# Patient Record
Sex: FEMALE | Race: ASIAN | Employment: OTHER | URBAN - METROPOLITAN AREA
[De-identification: names, ages, dates, MRNs, and addresses within clinical notes are randomized per-mention and may not be internally consistent; named-entity substitution may affect disease eponyms.]

---

## 2019-11-27 ENCOUNTER — APPOINTMENT (OUTPATIENT)
Dept: GENERAL RADIOLOGY | Facility: HOSPITAL | Age: 79
DRG: 390 | End: 2019-11-27
Attending: EMERGENCY MEDICINE
Payer: MEDICAID

## 2019-11-27 ENCOUNTER — HOSPITAL ENCOUNTER (INPATIENT)
Facility: HOSPITAL | Age: 79
LOS: 3 days | Discharge: HOME | DRG: 390 | End: 2019-11-30
Attending: EMERGENCY MEDICINE | Admitting: INTERNAL MEDICINE
Payer: MEDICAID

## 2019-11-27 DIAGNOSIS — R10.9 ABDOMINAL PAIN, ACUTE: Primary | ICD-10-CM

## 2019-11-27 DIAGNOSIS — K56.609 SMALL BOWEL OBSTRUCTION (HCC): ICD-10-CM

## 2019-11-27 PROBLEM — D72.829 LEUKOCYTOSIS, UNSPECIFIED TYPE: Status: ACTIVE | Noted: 2019-11-27

## 2019-11-27 PROBLEM — R01.1 CARDIAC MURMUR, UNSPECIFIED: Status: ACTIVE | Noted: 2019-11-27

## 2019-11-27 PROBLEM — R11.2 NON-INTRACTABLE VOMITING WITH NAUSEA, UNSPECIFIED VOMITING TYPE: Status: ACTIVE | Noted: 2019-11-27

## 2019-11-27 PROBLEM — R17 TOTAL BILIRUBIN, ELEVATED: Status: ACTIVE | Noted: 2019-11-27

## 2019-11-27 PROCEDURE — 80053 COMPREHEN METABOLIC PANEL: CPT | Performed by: PHYSICIAN ASSISTANT

## 2019-11-27 PROCEDURE — 83690 ASSAY OF LIPASE: CPT | Performed by: PHYSICIAN ASSISTANT

## 2019-11-27 PROCEDURE — 71045 X-RAY EXAM CHEST 1 VIEW: CPT | Performed by: EMERGENCY MEDICINE

## 2019-11-27 PROCEDURE — 96361 HYDRATE IV INFUSION ADD-ON: CPT

## 2019-11-27 PROCEDURE — 99285 EMERGENCY DEPT VISIT HI MDM: CPT

## 2019-11-27 PROCEDURE — 96376 TX/PRO/DX INJ SAME DRUG ADON: CPT

## 2019-11-27 PROCEDURE — 96374 THER/PROPH/DIAG INJ IV PUSH: CPT

## 2019-11-27 PROCEDURE — 85025 COMPLETE CBC W/AUTO DIFF WBC: CPT | Performed by: PHYSICIAN ASSISTANT

## 2019-11-27 RX ORDER — HYDROMORPHONE HYDROCHLORIDE 1 MG/ML
0.4 INJECTION, SOLUTION INTRAMUSCULAR; INTRAVENOUS; SUBCUTANEOUS EVERY 2 HOUR PRN
Status: DISCONTINUED | OUTPATIENT
Start: 2019-11-27 | End: 2019-11-30

## 2019-11-27 RX ORDER — HEPARIN SODIUM 5000 [USP'U]/ML
5000 INJECTION, SOLUTION INTRAVENOUS; SUBCUTANEOUS EVERY 8 HOURS SCHEDULED
Status: DISCONTINUED | OUTPATIENT
Start: 2019-11-27 | End: 2019-11-30

## 2019-11-27 RX ORDER — MORPHINE SULFATE 4 MG/ML
4 INJECTION, SOLUTION INTRAMUSCULAR; INTRAVENOUS ONCE
Status: DISCONTINUED | OUTPATIENT
Start: 2019-11-27 | End: 2019-11-30

## 2019-11-27 RX ORDER — ONDANSETRON 2 MG/ML
4 INJECTION INTRAMUSCULAR; INTRAVENOUS ONCE
Status: COMPLETED | OUTPATIENT
Start: 2019-11-27 | End: 2019-11-27

## 2019-11-27 RX ORDER — ONDANSETRON 2 MG/ML
INJECTION INTRAMUSCULAR; INTRAVENOUS
Status: DISPENSED
Start: 2019-11-27 | End: 2019-11-28

## 2019-11-27 RX ORDER — LIDOCAINE HYDROCHLORIDE 20 MG/ML
JELLY TOPICAL
Status: COMPLETED
Start: 2019-11-27 | End: 2019-11-27

## 2019-11-27 RX ORDER — ONDANSETRON 2 MG/ML
4 INJECTION INTRAMUSCULAR; INTRAVENOUS EVERY 6 HOURS PRN
Status: DISCONTINUED | OUTPATIENT
Start: 2019-11-27 | End: 2019-11-30

## 2019-11-27 RX ORDER — ONDANSETRON 2 MG/ML
4 INJECTION INTRAMUSCULAR; INTRAVENOUS EVERY 4 HOURS PRN
Status: DISCONTINUED | OUTPATIENT
Start: 2019-11-27 | End: 2019-11-27

## 2019-11-27 RX ORDER — SODIUM CHLORIDE 9 MG/ML
INJECTION, SOLUTION INTRAVENOUS CONTINUOUS
Status: DISCONTINUED | OUTPATIENT
Start: 2019-11-27 | End: 2019-11-30

## 2019-11-27 RX ORDER — METOCLOPRAMIDE HYDROCHLORIDE 5 MG/ML
5 INJECTION INTRAMUSCULAR; INTRAVENOUS EVERY 8 HOURS PRN
Status: DISCONTINUED | OUTPATIENT
Start: 2019-11-27 | End: 2019-11-30

## 2019-11-27 RX ORDER — LIDOCAINE HYDROCHLORIDE 20 MG/ML
10 JELLY TOPICAL ONCE
Status: COMPLETED | OUTPATIENT
Start: 2019-11-27 | End: 2019-11-27

## 2019-11-27 RX ORDER — HYDROMORPHONE HYDROCHLORIDE 1 MG/ML
0.8 INJECTION, SOLUTION INTRAMUSCULAR; INTRAVENOUS; SUBCUTANEOUS EVERY 2 HOUR PRN
Status: DISCONTINUED | OUTPATIENT
Start: 2019-11-27 | End: 2019-11-30

## 2019-11-27 RX ORDER — SODIUM CHLORIDE 9 MG/ML
INJECTION, SOLUTION INTRAVENOUS CONTINUOUS
Status: ACTIVE | OUTPATIENT
Start: 2019-11-27 | End: 2019-11-27

## 2019-11-27 RX ORDER — HYDROMORPHONE HYDROCHLORIDE 1 MG/ML
0.2 INJECTION, SOLUTION INTRAMUSCULAR; INTRAVENOUS; SUBCUTANEOUS EVERY 2 HOUR PRN
Status: DISCONTINUED | OUTPATIENT
Start: 2019-11-27 | End: 2019-11-30

## 2019-11-27 RX ORDER — MORPHINE SULFATE 4 MG/ML
4 INJECTION, SOLUTION INTRAMUSCULAR; INTRAVENOUS EVERY 30 MIN PRN
Status: DISCONTINUED | OUTPATIENT
Start: 2019-11-27 | End: 2019-11-27

## 2019-11-28 PROCEDURE — 87077 CULTURE AEROBIC IDENTIFY: CPT | Performed by: INTERNAL MEDICINE

## 2019-11-28 PROCEDURE — 87086 URINE CULTURE/COLONY COUNT: CPT | Performed by: INTERNAL MEDICINE

## 2019-11-28 PROCEDURE — 81001 URINALYSIS AUTO W/SCOPE: CPT | Performed by: INTERNAL MEDICINE

## 2019-11-28 PROCEDURE — 85025 COMPLETE CBC W/AUTO DIFF WBC: CPT | Performed by: INTERNAL MEDICINE

## 2019-11-28 PROCEDURE — 80048 BASIC METABOLIC PNL TOTAL CA: CPT | Performed by: INTERNAL MEDICINE

## 2019-11-28 NOTE — PLAN OF CARE
NURSING ADMISSION NOTE      Patient admitted via cart. Oriented to room. Safety precautions initiated. Bed in low position. Call light in reach. Maintain strict NPO. IVF infusing. NG LIS. PRN nausea and pain med provided with relief.  Labs in am.

## 2019-11-28 NOTE — ED PROVIDER NOTES
Patient Seen in: [facility] Emergency Department      History   Patient presents with:  Abdomen/Flank Pain (GI/)    Stated Complaint: abd pain    HPI    61-year-old female who comes in today with acute abdominal pain with a past medical history of • CHOLECYSTECTOMY  07/2019    while in [facility]   • TOTAL ABDOM HYSTERECTOMY                      Social History    Tobacco Use      Smoking status: Never Smoker      Smokeless tobacco: Never Used    Alcohol use: No    Drug use:  No             Review of The following orders were created for panel order CBC WITH DIFFERENTIAL WITH PLATELET.   Procedure                               Abnormality         Status                     ---------                               -----------         ------ 11/27/2019  7:18 pm    Follow-up:  Timoteo Chanel MD  [address] [place] [address],9D              Medications Prescribed:  Current Discharge Medication List                   Present on Admission  Date Reviewed: 11/27/2019

## 2019-11-28 NOTE — PROGRESS NOTES
11/28/19 0924   Clinical Encounter Type   Visited With Patient  ( responded to Advanced Directive consult by giving patient the POA for Chelly [employer] 14 short form for her perusal and consideration.  )   Continue Visiting No  ( remain

## 2019-11-28 NOTE — ED NOTES
I reviewed that chart and discussed the case. I have examined the patient and noted that this is a 28-year-old female who arrives here with complaints of abdominal pain since Monday. The patient was seen at immediate care was told outpatient imaging.   Makayla Galindo

## 2019-11-28 NOTE — ED INITIAL ASSESSMENT (HPI)
Pt c/o abdominal pain, Pt was seen at  on Monday and was advised to go to outpatient imaging for R/O SBO.  Images showed Pt does have SMO, PT rpt she has begun to vomit today and has not had BM in 2-days

## 2019-11-28 NOTE — CONSULTS
[facility]  Report of Consultation    Benjychavez Jonesтатьяна Patient Status:  Inpatient    1940 MRN [id number]   [facility] 3NW-A Attending Marissa Mcbride MD   Hosp Day # 1 PCP Hollis Valenzuela MD     Reason for Consultation:  Abdominal Units, 5,000 Units, Subcutaneous, Q8H [address]  •  HYDROmorphone HCl (DILAUDID) 1 MG/ML injection 0.2 mg, 0.2 mg, Intravenous, Q2H PRN **OR** HYDROmorphone HCl (DILAUDID) 1 MG/ML injection 0.4 mg, 0.4 mg, Intravenous, Q2H PRN **OR** HYDROmorphone HCl (DILAUDID) examination is grossly normal.  No focal neurologic deficit.     Laboratory Data:  Lab Results   Component Value Date    WBC 11.5 11/28/2019    HGB 13.9 11/28/2019    HCT 43.1 11/28/2019    .0 11/28/2019    CREATSERUM 0.87 11/28/2019    BUN 15 11/28/

## 2019-11-28 NOTE — H&P
[facility] Hospitalist Team  History and Physical       Assessment/Plan:     #SBO  -conservative management  -ct reviewed, abdo xr today is pending  -general surgery following  -NGT in place, NPO, IVF, pain control    #HTN- hold bp meds, BP ok currently    #HL- h diarrhea. GENITOURINARY:  No dysuria, frequency or urgency. MUSCULOSKELETAL:  No arthritis  SKIN:  No change in skin, hair or nails. NEUROLOGIC:  No paresthesias, weakness, or numbness. PSYCHIATRIC:  No disorder of thought or mood.   ENDOCRINE:  No heat CHEST AP PORTABLE  (CPT=71045)  TECHNIQUE:  AP chest radiograph was obtained. COMPARISON:  None.   INDICATIONS:  Verify correct tube placement  PATIENT STATED HISTORY: (As transcribed by Technologist)     FINDINGS:  Tip of NG tube just distal to the gastro

## 2019-11-28 NOTE — PROGRESS NOTES
[facility] Pharmacy Note:  Renal Dose Adjustment for Metoclopramide (REGLAN)    Ankur Jean Baptiste has been prescribed Metoclopramide (REGLAN) 10 mg every 8 hours as needed for nausea,vomiting. Estimated Creatinine Clearance: 39.8 mL/min (based on SCr of 0.98 mg/dL).

## 2019-11-28 NOTE — PROGRESS NOTES
Vss. Pt a&ox3. Pt on ra with 02 sats wnl. Lungs cta. Pt denies difficulty breathing or sob. Pt denies pain.  Pt denies n/v. Patient states she is passing flatus and did have small formed bm this am. Remains npo. ngt in place to [facility] with light brown drainage

## 2019-11-29 ENCOUNTER — APPOINTMENT (OUTPATIENT)
Dept: GENERAL RADIOLOGY | Facility: HOSPITAL | Age: 79
DRG: 390 | End: 2019-11-29
Attending: SURGERY
Payer: MEDICAID

## 2019-11-29 PROCEDURE — 74019 RADEX ABDOMEN 2 VIEWS: CPT | Performed by: SURGERY

## 2019-11-29 PROCEDURE — 84132 ASSAY OF SERUM POTASSIUM: CPT | Performed by: INTERNAL MEDICINE

## 2019-11-29 RX ORDER — POTASSIUM CHLORIDE 14.9 MG/ML
20 INJECTION INTRAVENOUS ONCE
Status: COMPLETED | OUTPATIENT
Start: 2019-11-29 | End: 2019-11-29

## 2019-11-29 NOTE — PROGRESS NOTES
[facility] Hospitalist Team  Progress Note      Kelton Running  5/22/1940    Assessment/Plan:     #SBO  -conservative management  -ct reviewed, abdo xr today is pending  -general surgery following  -NGT in place, NPO, IVF, pain control  -NGT is clamped today    #HTN HYDROmorphone HCl, ondansetron HCl, Metoclopramide HCl       Principal Problem:    Abdominal pain, acute  Active Problems:    Small bowel obstruction (HCC)

## 2019-11-29 NOTE — PROGRESS NOTES
[facility]  Progress Note    Shaji Doe Patient Status:  Inpatient    1940 MRN [id number]   [facility] 3NW-A Attending Chary Baird MD   Hosp Day # 2 PCP Fátima Espitia MD     Subjective:  Patient passed flatus this valdez

## 2019-11-30 VITALS
DIASTOLIC BLOOD PRESSURE: 38 MMHG | RESPIRATION RATE: 18 BRPM | BODY MASS INDEX: 21.44 KG/M2 | SYSTOLIC BLOOD PRESSURE: 124 MMHG | HEIGHT: 63 IN | TEMPERATURE: 98 F | HEART RATE: 61 BPM | WEIGHT: 121 LBS | OXYGEN SATURATION: 97 %

## 2019-11-30 NOTE — PROGRESS NOTES
[facility]  Progress Note    Kemi Hurt Patient Status:  Inpatient    1940 MRN [id number]   [facility] 3NW-A Attending Tommy Edmonds MD   Hosp Day # 3 PCP Shirl Collet, MD     Subjective:  Patient is feeling much better.

## 2019-11-30 NOTE — PLAN OF CARE
Problem: PAIN - ADULT  Goal: Verbalizes/displays adequate comfort level or patient's stated pain goal  Description  INTERVENTIONS:  - Encourage pt to monitor pain and request assistance  - Assess pain using appropriate pain scale  - Administer analgesics wearing SCD's to the patient and the risks associated with not wearing them, patient verbalizes understanding and agreeable to wearing SCD's. NG discontinued per MD's orders, tip intact, patient tolerated well.  Patient instructed to notify staff if pt begi

## 2019-11-30 NOTE — PLAN OF CARE
Problem: PAIN - ADULT  Goal: Verbalizes/displays adequate comfort level or patient's stated pain goal  Description  INTERVENTIONS:  - Encourage pt to monitor pain and request assistance  - Assess pain using appropriate pain scale  - Administer analgesics Juan Jose Mccormick RN  Outcome: Progressing

## 2019-11-30 NOTE — CM/SW NOTE
Received call from pt's dtr, Louie Landa ([phone]) expressing concern that pt is uninsured and requesting information about financial assistance. Discussed whether pt may be eligible for Medicaid.   Pt is green card [place] x 6 years and pt/family have no

## 2019-11-30 NOTE — PLAN OF CARE
Pt states she is feeling \"great\" tonight, NG remains clamped, pt denies any nausea, increased pain, bloating/distention. Abdomen is soft, bowel sounds active throughout. Passing flatus, no BM at this time.  Pt ambulated halls this evening, tolerating acti Encourage mobilization and activity  - Obtain nutritional consult as needed  - Establish a toileting routine/schedule  - Consider collaborating with pharmacy to review patient's medication profile  Outcome: Progressing

## 2019-11-30 NOTE — PLAN OF CARE
NO RESPIRATORY DIFFICULTY NOTED, ABDOMEN SOFT, BOWEL SOUNDS NOTED, STATES PASSING SOME FLATUS, NG DRAINS BROWN FLUID THIS AM, CLAMPED AT 1200 AND NO C/O NAUSEA, PAIN OR BLOATING SINCE NG TUBE CLAMPED, DENIES ANY URINARY DIFFICULTY, VOIDING IN GOOD AMOUNTS,

## 2019-11-30 NOTE — DISCHARGE SUMMARY
General Medicine Discharge Summary     Patient ID:  Juan Shoulders  70year old  5/22/1940    Admit date: 11/27/2019    Discharge date and time: 11/30/19.      Attending Physician: Lucy Ac DO Code      Exam on day of discharge:      11/30/19  0500   BP: 124/38   Pulse: 61   Resp: 18   Temp: 97.8 °F (36.6 °C)       General: no acute distress, alert and oriented x 3  Heart: RRR  Lungs: clear bilaterally, no active wheezing  Abdomen: nontender, no

## 2020-12-20 ENCOUNTER — HOSPITAL ENCOUNTER (INPATIENT)
Facility: HOSPITAL | Age: 80
LOS: 2 days | Discharge: HOME | DRG: 390 | End: 2020-12-22
Attending: INTERNAL MEDICINE | Admitting: INTERNAL MEDICINE
Payer: MEDICARE

## 2020-12-20 PROBLEM — K56.609 SBO (SMALL BOWEL OBSTRUCTION) (HCC): Status: ACTIVE | Noted: 2020-12-20

## 2020-12-20 RX ORDER — METOCLOPRAMIDE HYDROCHLORIDE 5 MG/ML
10 INJECTION INTRAMUSCULAR; INTRAVENOUS EVERY 8 HOURS PRN
Status: DISCONTINUED | OUTPATIENT
Start: 2020-12-20 | End: 2020-12-22

## 2020-12-20 RX ORDER — KETOCONAZOLE 20 MG/ML
1 SHAMPOO TOPICAL WEEKLY
COMMUNITY
Start: 2020-07-15 | End: 2021-01-18

## 2020-12-20 RX ORDER — HEPARIN SODIUM 5000 [USP'U]/ML
5000 INJECTION, SOLUTION INTRAVENOUS; SUBCUTANEOUS EVERY 8 HOURS SCHEDULED
Status: DISCONTINUED | OUTPATIENT
Start: 2020-12-20 | End: 2020-12-22

## 2020-12-20 RX ORDER — ONDANSETRON 2 MG/ML
4 INJECTION INTRAMUSCULAR; INTRAVENOUS EVERY 6 HOURS PRN
Status: DISCONTINUED | OUTPATIENT
Start: 2020-12-20 | End: 2020-12-22

## 2020-12-20 RX ORDER — SODIUM CHLORIDE 9 MG/ML
INJECTION, SOLUTION INTRAVENOUS CONTINUOUS
Status: DISCONTINUED | OUTPATIENT
Start: 2020-12-20 | End: 2020-12-22

## 2020-12-20 RX ORDER — MOMETASONE FUROATE 1 MG/G
1 OINTMENT TOPICAL DAILY PRN
COMMUNITY
Start: 2020-10-05

## 2020-12-20 RX ORDER — MORPHINE SULFATE 4 MG/ML
1 INJECTION, SOLUTION INTRAMUSCULAR; INTRAVENOUS EVERY 2 HOUR PRN
Status: DISCONTINUED | OUTPATIENT
Start: 2020-12-20 | End: 2020-12-22

## 2020-12-20 RX ORDER — MORPHINE SULFATE 4 MG/ML
2 INJECTION, SOLUTION INTRAMUSCULAR; INTRAVENOUS EVERY 2 HOUR PRN
Status: DISCONTINUED | OUTPATIENT
Start: 2020-12-20 | End: 2020-12-22

## 2020-12-20 RX ORDER — MORPHINE SULFATE 4 MG/ML
4 INJECTION, SOLUTION INTRAMUSCULAR; INTRAVENOUS EVERY 2 HOUR PRN
Status: DISCONTINUED | OUTPATIENT
Start: 2020-12-20 | End: 2020-12-22

## 2020-12-20 NOTE — PROGRESS NOTES
1458: Dr. Luci Lopes has been paged about patient admission to the unit and need for orders. Dr. Luci Lopes arrived to see patient.

## 2020-12-20 NOTE — H&P
[facility] Hospitalist History and Physical      CC:      PCP: Myra Griffin MD      History of Present Illness: Patient is a 68year old female with PMH sig for HTN and HL who presents to [facility] with abdominal pain and n/v.      She was admitted November 2019 for s rhythm, S1, S2 normal, no murmur, rub or gallop appreciated   Abdomen:   Soft, non-tender. Bowel sounds normal. No masses,  No organomegaly. Non distended   Extremities: Extremities normal, atraumatic, no cyanosis or edema.    Skin: Skin color, texture, tur splenic vein, and SMV. Normal caliber abdominal aorta. Lymph nodes: No pathologically enlarged lymph nodes by CT criteria. Other: No ascites or free air. Bones: No aggressive osseous lesions.  1.6 cm peripherally sclerotic centrally lucent lesion in the ri

## 2020-12-20 NOTE — PLAN OF CARE
Problem: Patient/Family Goals  Goal: Patient/Family Long Term Goal  Description: Patient's Long Term Goal: Discharge home    Interventions:  - Pain tolerable   Diet tolerable   - See additional Care Plan goals for specific interventions  Outcome: Progres Progressing  Goal: Achieves appropriate nutritional intake (bariatric)  Description: INTERVENTIONS:  - Monitor for over-consumption  - Identify factors contributing to increased intake, treat as appropriate  - Monitor I&O, WT and lab values  - Obtain nutri

## 2020-12-21 PROCEDURE — 87493 C DIFF AMPLIFIED PROBE: CPT | Performed by: INTERNAL MEDICINE

## 2020-12-21 PROCEDURE — 84132 ASSAY OF SERUM POTASSIUM: CPT | Performed by: INTERNAL MEDICINE

## 2020-12-21 PROCEDURE — 85025 COMPLETE CBC W/AUTO DIFF WBC: CPT | Performed by: INTERNAL MEDICINE

## 2020-12-21 PROCEDURE — 80048 BASIC METABOLIC PNL TOTAL CA: CPT | Performed by: INTERNAL MEDICINE

## 2020-12-21 RX ORDER — CALCIUM CARBONATE 200(500)MG
500 TABLET,CHEWABLE ORAL
Status: DISCONTINUED | OUTPATIENT
Start: 2020-12-21 | End: 2020-12-22

## 2020-12-21 RX ORDER — DICYCLOMINE HYDROCHLORIDE 10 MG/1
10 CAPSULE ORAL
Status: DISCONTINUED | OUTPATIENT
Start: 2020-12-21 | End: 2020-12-22

## 2020-12-21 NOTE — PROGRESS NOTES
12/20/20 2018   Clinical Encounter Type   Visited With Patient   Routine Visit Introduction   Continue Visiting No   Patient's Supportive Strategies/Resources  shared prayer with the pt   Baptism Encounters   Baptism Needs Prayer   Sacrament

## 2020-12-21 NOTE — CONSULTS
[facility]  Report of Consultation    Darleen Led Patient Status:  Inpatient    1940 MRN [id number]   [facility] 0SW-A Attending Shana Lesches, MD   [facility] Day # 1 PCP Jailene Arauz MD     20    Reason for Consultation: Dicyclomine HCl (BENTYL) cap 10 mg, 10 mg, Oral, TID AC&HS  •  Calcium Carbonate Antacid (TUMS) chewable tab 500 mg, 500 mg, Oral, Daily PRN  •  0.9% NaCl infusion, , Intravenous, Continuous  •  Heparin Sodium (Porcine) 5000 UNIT/ML injection 5,000 Units, 12/20/20  1053 12/21/20  0402   WBC 12.03 10.1   HGB 14.4 11.8*   MCV 82.2 83.0    306.0       Recent Labs   Lab 12/20/20  1053 12/21/20  0402 12/21/20  0845    142  --    K 3.73 3.6 3.7   CL 97* 110  --    CO2 27.8 28.0  --    BUN 19.0 11  -- hysterectomy. Vasculature: Patent portal vein, splenic vein, and SMV. Normal caliber abdominal aorta. Lymph nodes: No pathologically enlarged lymph nodes by CT criteria. Other: No ascites or free air. Bones: No aggressive osseous lesions.  1.6 cm periphera

## 2020-12-21 NOTE — PROGRESS NOTES
[facility] Hospitalist Progress Note                                                                   [facility]  5/22/1940    SUBJECTIVE:  Having  +BS and several loose BMs. Still w spasms.

## 2020-12-21 NOTE — PROGRESS NOTES
PATIENT HAS IV FLUIDS INFUSING, STRICT NPO, AMBULATES WITH STANDBY ASSIST, ON ROOM AIR, VOIDING WELL, PAIN TOLERABLE-DENIES NEEDING PAIN MEDICATION, DENIES NAUSEA, HAD BM TODAY. DAUGHTER PRESENT AT BEDSIDE. WILL CONTINUE TO MONITOR.

## 2020-12-21 NOTE — PLAN OF CARE
Problem: Patient/Family Goals  Goal: Patient/Family Long Term Goal  Description: Patient's Long Term Goal: Discharge home    Interventions:  - Pain tolerable   Diet tolerable   - See additional Care Plan goals for specific interventions  Outcome: Progres

## 2020-12-21 NOTE — CM/SW NOTE
SW completed a chart review to identify needs and provide discharge planning if needed. Rounds completed with RN. SW identified that pt lives at home. No discharge needs identified at this time but SW to remain available to assist if needed.     Social w

## 2020-12-21 NOTE — PROGRESS NOTES
Pt resting in bed, easy non labored breathing on ra. VS wnl. Iv fluids infusing without difficulty. Pt denies any nausea, or pain. Voids adequate amount. Bilateral scd's in place. Plan of care discussed. All questions answered.  Instructed to call if any ne

## 2020-12-22 VITALS
RESPIRATION RATE: 18 BRPM | DIASTOLIC BLOOD PRESSURE: 54 MMHG | TEMPERATURE: 98 F | HEART RATE: 61 BPM | OXYGEN SATURATION: 100 % | SYSTOLIC BLOOD PRESSURE: 116 MMHG

## 2020-12-22 PROCEDURE — 84132 ASSAY OF SERUM POTASSIUM: CPT | Performed by: INTERNAL MEDICINE

## 2020-12-22 RX ORDER — POTASSIUM CHLORIDE 20 MEQ/1
40 TABLET, EXTENDED RELEASE ORAL EVERY 4 HOURS
Status: COMPLETED | OUTPATIENT
Start: 2020-12-22 | End: 2020-12-22

## 2020-12-22 NOTE — PLAN OF CARE
Problem: Patient/Family Goals  Goal: Patient/Family Long Term Goal  Description: Patient's Long Term Goal: Discharge home    Interventions:  - Pain tolerable   Diet tolerable   - See additional Care Plan goals for specific interventions  Outcome: Atrhur Palomares eating environment  Outcome: Adequate for Discharge  Goal: Achieves appropriate nutritional intake (bariatric)  Description: INTERVENTIONS:  - Monitor for over-consumption  - Identify factors contributing to increased intake, treat as appropriate  - Monito

## 2020-12-22 NOTE — PROGRESS NOTES
NURSING DISCHARGE NOTE    Discharged Home via Wheelchair. Accompanied by Family member and Support staff  Belongings Taken by patient/family. discussed d/c instructions with patient and family . Answered all questions . Verbalized understanding .

## 2020-12-22 NOTE — PROGRESS NOTES
Pt resting in bed, easy nonlabored breathing on ra. Vs wnl. Up with sb assist. Steady gait. Iv fluids infusing without difficulty. Voids adequate amount. Plan of care discussed. All questions answered. Instructed to call if any needs arise.  Call light with

## 2020-12-22 NOTE — DISCHARGE SUMMARY
General Medicine Discharge Summary     Patient ID:  Bisi Fulton  68year old  5/22/1940    Admit date: 12/20/2020    Discharge date and time:12/22/2020  Attending Physician: Jamie Atwood MD 116/54   Pulse: 61   Resp: 18   Temp: 98.4 °F (36.9 °C)       General: no acute distress, alert and oriented x 3  Heart: RRR  Lungs: clear bilaterally, no active wheezing  Abdomen: nontender, nondistended, intact BS  Extremities: no pedal edema   Neuro: CN

## 2021-02-20 ENCOUNTER — HOSPITAL ENCOUNTER (EMERGENCY)
Facility: HOSPITAL | Age: 81
Discharge: HOME | End: 2021-02-21
Attending: EMERGENCY MEDICINE
Payer: MEDICARE

## 2021-02-20 DIAGNOSIS — S01.01XA LACERATION OF SCALP, INITIAL ENCOUNTER: Primary | ICD-10-CM

## 2021-02-20 PROCEDURE — 99284 EMERGENCY DEPT VISIT MOD MDM: CPT

## 2021-02-20 PROCEDURE — 12001 RPR S/N/AX/GEN/TRNK 2.5CM/<: CPT

## 2021-02-20 RX ORDER — LIDOCAINE HYDROCHLORIDE 10 MG/ML
INJECTION, SOLUTION INFILTRATION; PERINEURAL
Status: COMPLETED
Start: 2021-02-20 | End: 2021-02-20

## 2021-02-20 RX ORDER — LIDOCAINE HYDROCHLORIDE 10 MG/ML
1 INJECTION, SOLUTION INFILTRATION; PERINEURAL ONCE
Status: COMPLETED | OUTPATIENT
Start: 2021-02-20 | End: 2021-02-20

## 2021-02-20 RX ORDER — ACETAMINOPHEN 500 MG
1000 TABLET ORAL ONCE
Status: COMPLETED | OUTPATIENT
Start: 2021-02-20 | End: 2021-02-20

## 2021-02-21 ENCOUNTER — APPOINTMENT (OUTPATIENT)
Dept: CT IMAGING | Facility: HOSPITAL | Age: 81
End: 2021-02-21
Attending: EMERGENCY MEDICINE
Payer: MEDICARE

## 2021-02-21 VITALS
OXYGEN SATURATION: 98 % | RESPIRATION RATE: 16 BRPM | HEART RATE: 76 BPM | DIASTOLIC BLOOD PRESSURE: 86 MMHG | HEIGHT: 63 IN | TEMPERATURE: 98 F | BODY MASS INDEX: 21.26 KG/M2 | SYSTOLIC BLOOD PRESSURE: 150 MMHG | WEIGHT: 120 LBS

## 2021-02-21 PROCEDURE — 70450 CT HEAD/BRAIN W/O DYE: CPT | Performed by: EMERGENCY MEDICINE

## 2021-02-21 NOTE — ED PROVIDER NOTES
Patient Seen in: [facility] Emergency Department      History   Patient presents with:  Fall    Stated Complaint: fall    HPI/Subjective:   HPI    Patient 79-year-old female who slipped on the ice just prior to arrival.  She hit her head but did not is not in acute distress. Appearance: She is well-developed. She is not toxic-appearing. HENT:      Head: Normocephalic. Comments: 2 cm laceration posterior scalp  Eyes:      General: No scleral icterus.      Conjunctiva/sclera: Conjunctivae norm possible for a visit in 1 week  Return to the ER if you feel worse in any way, Return to the ER if you have any concerns          Medications Prescribed:  Current Discharge Medication List

## 2021-02-28 ENCOUNTER — APPOINTMENT (OUTPATIENT)
Dept: GENERAL RADIOLOGY | Facility: HOSPITAL | Age: 81
DRG: 390 | End: 2021-02-28
Attending: EMERGENCY MEDICINE
Payer: MEDICARE

## 2021-02-28 ENCOUNTER — HOSPITAL ENCOUNTER (INPATIENT)
Facility: HOSPITAL | Age: 81
LOS: 4 days | Discharge: HOME | DRG: 390 | End: 2021-03-04
Attending: EMERGENCY MEDICINE | Admitting: INTERNAL MEDICINE
Payer: MEDICARE

## 2021-02-28 ENCOUNTER — APPOINTMENT (OUTPATIENT)
Dept: CT IMAGING | Facility: HOSPITAL | Age: 81
DRG: 390 | End: 2021-02-28
Attending: EMERGENCY MEDICINE
Payer: MEDICARE

## 2021-02-28 DIAGNOSIS — K56.609 SMALL BOWEL OBSTRUCTION (HCC): Primary | ICD-10-CM

## 2021-02-28 PROBLEM — R79.89 AZOTEMIA: Status: ACTIVE | Noted: 2021-02-28

## 2021-02-28 PROBLEM — R73.9 HYPERGLYCEMIA: Status: ACTIVE | Noted: 2021-02-28

## 2021-02-28 PROBLEM — D72.829 LEUKOCYTOSIS: Status: ACTIVE | Noted: 2021-02-28

## 2021-02-28 LAB
ALBUMIN SERPL-MCNC: 4.4 G/DL (ref 3.4–5)
ALBUMIN/GLOB SERPL: 1.1 {RATIO} (ref 1–2)
ALP LIVER SERPL-CCNC: 93 U/L
ALT SERPL-CCNC: 25 U/L
ANION GAP SERPL CALC-SCNC: 11 MMOL/L (ref 0–18)
AST SERPL-CCNC: 19 U/L (ref 15–37)
BASOPHILS # BLD AUTO: 0.05 X10(3) UL (ref 0–0.2)
BASOPHILS NFR BLD AUTO: 0.3 %
BILIRUB SERPL-MCNC: 1.4 MG/DL (ref 0.1–2)
BUN BLD-MCNC: 21 MG/DL (ref 7–18)
BUN/CREAT SERPL: 20.2 (ref 10–20)
CALCIUM BLD-MCNC: 11.3 MG/DL (ref 8.5–10.1)
CHLORIDE SERPL-SCNC: 102 MMOL/L (ref 98–112)
CO2 SERPL-SCNC: 24 MMOL/L (ref 21–32)
CREAT BLD-MCNC: 1.04 MG/DL
DEPRECATED RDW RBC AUTO: 40.2 FL (ref 35.1–46.3)
EOSINOPHIL # BLD AUTO: 0 X10(3) UL (ref 0–0.7)
EOSINOPHIL NFR BLD AUTO: 0 %
ERYTHROCYTE [DISTWIDTH] IN BLOOD BY AUTOMATED COUNT: 13.8 % (ref 11–15)
GLOBULIN PLAS-MCNC: 4.1 G/DL (ref 2.8–4.4)
GLUCOSE BLD-MCNC: 141 MG/DL (ref 70–99)
HCT VFR BLD AUTO: 41.3 %
HGB BLD-MCNC: 13.7 G/DL
IMM GRANULOCYTES # BLD AUTO: 0.08 X10(3) UL (ref 0–1)
IMM GRANULOCYTES NFR BLD: 0.4 %
LIPASE SERPL-CCNC: 92 U/L (ref 73–393)
LYMPHOCYTES # BLD AUTO: 1.5 X10(3) UL (ref 1–4)
LYMPHOCYTES NFR BLD AUTO: 8.4 %
M PROTEIN MFR SERPL ELPH: 8.5 G/DL (ref 6.4–8.2)
MCH RBC QN AUTO: 26.7 PG (ref 26–34)
MCHC RBC AUTO-ENTMCNC: 33.2 G/DL (ref 31–37)
MCV RBC AUTO: 80.5 FL
MONOCYTES # BLD AUTO: 1.19 X10(3) UL (ref 0.1–1)
MONOCYTES NFR BLD AUTO: 6.6 %
NEUTROPHILS # BLD AUTO: 15.1 X10 (3) UL (ref 1.5–7.7)
NEUTROPHILS # BLD AUTO: 15.1 X10(3) UL (ref 1.5–7.7)
NEUTROPHILS NFR BLD AUTO: 84.3 %
OSMOLALITY SERPL CALC.SUM OF ELEC: 289 MOSM/KG (ref 275–295)
PLATELET # BLD AUTO: 355 10(3)UL (ref 150–450)
POTASSIUM SERPL-SCNC: 4.1 MMOL/L (ref 3.5–5.1)
RBC # BLD AUTO: 5.13 X10(6)UL
SARS-COV-2 RNA RESP QL NAA+PROBE: NOT DETECTED
SODIUM SERPL-SCNC: 137 MMOL/L (ref 136–145)
WBC # BLD AUTO: 17.9 X10(3) UL (ref 4–11)

## 2021-02-28 PROCEDURE — 80053 COMPREHEN METABOLIC PANEL: CPT | Performed by: EMERGENCY MEDICINE

## 2021-02-28 PROCEDURE — 74177 CT ABD & PELVIS W/CONTRAST: CPT | Performed by: EMERGENCY MEDICINE

## 2021-02-28 PROCEDURE — 85025 COMPLETE CBC W/AUTO DIFF WBC: CPT | Performed by: EMERGENCY MEDICINE

## 2021-02-28 PROCEDURE — 71045 X-RAY EXAM CHEST 1 VIEW: CPT | Performed by: EMERGENCY MEDICINE

## 2021-02-28 PROCEDURE — 99285 EMERGENCY DEPT VISIT HI MDM: CPT

## 2021-02-28 PROCEDURE — 96374 THER/PROPH/DIAG INJ IV PUSH: CPT

## 2021-02-28 PROCEDURE — 96361 HYDRATE IV INFUSION ADD-ON: CPT

## 2021-02-28 PROCEDURE — 83690 ASSAY OF LIPASE: CPT | Performed by: EMERGENCY MEDICINE

## 2021-02-28 PROCEDURE — 96375 TX/PRO/DX INJ NEW DRUG ADDON: CPT

## 2021-02-28 RX ORDER — POLYETHYLENE GLYCOL 3350 17 G/17G
17 POWDER, FOR SOLUTION ORAL DAILY PRN
Status: DISCONTINUED | OUTPATIENT
Start: 2021-02-28 | End: 2021-03-04

## 2021-02-28 RX ORDER — ONDANSETRON 2 MG/ML
4 INJECTION INTRAMUSCULAR; INTRAVENOUS EVERY 6 HOURS PRN
Status: DISCONTINUED | OUTPATIENT
Start: 2021-02-28 | End: 2021-03-04

## 2021-02-28 RX ORDER — ONDANSETRON 2 MG/ML
4 INJECTION INTRAMUSCULAR; INTRAVENOUS EVERY 4 HOURS PRN
Status: ACTIVE | OUTPATIENT
Start: 2021-02-28 | End: 2021-02-28

## 2021-02-28 RX ORDER — BISACODYL 10 MG
10 SUPPOSITORY, RECTAL RECTAL
Status: DISCONTINUED | OUTPATIENT
Start: 2021-02-28 | End: 2021-03-04

## 2021-02-28 RX ORDER — AMLODIPINE BESYLATE 5 MG/1
10 TABLET ORAL DAILY
Status: DISCONTINUED | OUTPATIENT
Start: 2021-03-01 | End: 2021-03-01

## 2021-02-28 RX ORDER — ACETAMINOPHEN 325 MG/1
650 TABLET ORAL EVERY 6 HOURS PRN
Status: DISCONTINUED | OUTPATIENT
Start: 2021-02-28 | End: 2021-03-04

## 2021-02-28 RX ORDER — SODIUM CHLORIDE 9 MG/ML
INJECTION, SOLUTION INTRAVENOUS CONTINUOUS
Status: DISCONTINUED | OUTPATIENT
Start: 2021-02-28 | End: 2021-03-04

## 2021-02-28 RX ORDER — MORPHINE SULFATE 4 MG/ML
4 INJECTION, SOLUTION INTRAMUSCULAR; INTRAVENOUS EVERY 30 MIN PRN
Status: DISCONTINUED | OUTPATIENT
Start: 2021-02-28 | End: 2021-03-01

## 2021-02-28 RX ORDER — SODIUM CHLORIDE 9 MG/ML
INJECTION, SOLUTION INTRAVENOUS CONTINUOUS
Status: ACTIVE | OUTPATIENT
Start: 2021-02-28 | End: 2021-02-28

## 2021-02-28 RX ORDER — HEPARIN SODIUM 5000 [USP'U]/ML
5000 INJECTION, SOLUTION INTRAVENOUS; SUBCUTANEOUS EVERY 8 HOURS SCHEDULED
Status: DISCONTINUED | OUTPATIENT
Start: 2021-02-28 | End: 2021-03-04

## 2021-02-28 RX ORDER — MORPHINE SULFATE 4 MG/ML
4 INJECTION, SOLUTION INTRAMUSCULAR; INTRAVENOUS EVERY 30 MIN PRN
Status: ACTIVE | OUTPATIENT
Start: 2021-02-28 | End: 2021-02-28

## 2021-02-28 RX ORDER — ONDANSETRON 2 MG/ML
4 INJECTION INTRAMUSCULAR; INTRAVENOUS ONCE
Status: COMPLETED | OUTPATIENT
Start: 2021-02-28 | End: 2021-02-28

## 2021-02-28 NOTE — ED PROVIDER NOTES
Patient Seen in: [facility] Emergency Department      History   Patient presents with:  Abdomen/Flank Pain  Sut Stap Jaja    Stated Complaint: abd spasms, staple removal    HPI/Subjective:   HPI    53-year-old with a history of hypertension, (36.4 °C)   Temp src Temporal   SpO2 99 %   O2 Device        Current:/62   Pulse 75   Temp 97.6 °F (36.4 °C) (Temporal)   Resp 25   Wt 54.4 kg   SpO2 100%   BMI 21.26 kg/m²         Physical Exam    General: Patient is awake, alert in no acute distres pelvis: Small bowel obstruction with transition point within the anterior central abdomen. Mild amount of free fluid within the abdomen and pelvis. Mild mesenteric edema. FINDINGS:    LUNG BASE:  Unremarkable.   LIVER:  There is a subcentimeter hypodense without difficulty. Fluids, Zofran, morphine ordered  NG tube ordered          MDM      68year-old with recurrent small bowel obstruction. She will be admitted for surgical consultation. Is reasonably comfortable after morphine.   Admission dispositi

## 2021-02-28 NOTE — ED INITIAL ASSESSMENT (HPI)
Reports here for staple removal from posterior scalp. Pt also requests evaluation for abd pain and vomiting x2 days.

## 2021-03-01 LAB
ANION GAP SERPL CALC-SCNC: 6 MMOL/L (ref 0–18)
BASOPHILS # BLD AUTO: 0.03 X10(3) UL (ref 0–0.2)
BASOPHILS NFR BLD AUTO: 0.2 %
BUN BLD-MCNC: 16 MG/DL (ref 7–18)
BUN/CREAT SERPL: 21.3 (ref 10–20)
CALCIUM BLD-MCNC: 9.1 MG/DL (ref 8.5–10.1)
CHLORIDE SERPL-SCNC: 108 MMOL/L (ref 98–112)
CO2 SERPL-SCNC: 29 MMOL/L (ref 21–32)
CREAT BLD-MCNC: 0.75 MG/DL
DEPRECATED RDW RBC AUTO: 41.6 FL (ref 35.1–46.3)
EOSINOPHIL # BLD AUTO: 0.03 X10(3) UL (ref 0–0.7)
EOSINOPHIL NFR BLD AUTO: 0.2 %
ERYTHROCYTE [DISTWIDTH] IN BLOOD BY AUTOMATED COUNT: 13.9 % (ref 11–15)
GLUCOSE BLD-MCNC: 112 MG/DL (ref 70–99)
HAV IGM SER QL: 2.4 MG/DL (ref 1.6–2.6)
HCT VFR BLD AUTO: 38 %
HGB BLD-MCNC: 12.4 G/DL
IMM GRANULOCYTES # BLD AUTO: 0.04 X10(3) UL (ref 0–1)
IMM GRANULOCYTES NFR BLD: 0.3 %
LYMPHOCYTES # BLD AUTO: 1.7 X10(3) UL (ref 1–4)
LYMPHOCYTES NFR BLD AUTO: 13.5 %
MCH RBC QN AUTO: 27 PG (ref 26–34)
MCHC RBC AUTO-ENTMCNC: 32.6 G/DL (ref 31–37)
MCV RBC AUTO: 82.6 FL
MONOCYTES # BLD AUTO: 1.34 X10(3) UL (ref 0.1–1)
MONOCYTES NFR BLD AUTO: 10.6 %
NEUTROPHILS # BLD AUTO: 9.49 X10 (3) UL (ref 1.5–7.7)
NEUTROPHILS # BLD AUTO: 9.49 X10(3) UL (ref 1.5–7.7)
NEUTROPHILS NFR BLD AUTO: 75.2 %
OSMOLALITY SERPL CALC.SUM OF ELEC: 298 MOSM/KG (ref 275–295)
PLATELET # BLD AUTO: 311 10(3)UL (ref 150–450)
POTASSIUM SERPL-SCNC: 3.6 MMOL/L (ref 3.5–5.1)
RBC # BLD AUTO: 4.6 X10(6)UL
SODIUM SERPL-SCNC: 143 MMOL/L (ref 136–145)
WBC # BLD AUTO: 12.6 X10(3) UL (ref 4–11)

## 2021-03-01 PROCEDURE — 80048 BASIC METABOLIC PNL TOTAL CA: CPT | Performed by: HOSPITALIST

## 2021-03-01 PROCEDURE — 83735 ASSAY OF MAGNESIUM: CPT | Performed by: HOSPITALIST

## 2021-03-01 PROCEDURE — 85025 COMPLETE CBC W/AUTO DIFF WBC: CPT | Performed by: HOSPITALIST

## 2021-03-01 PROCEDURE — 0D9670Z DRAINAGE OF STOMACH WITH DRAINAGE DEVICE, VIA NATURAL OR ARTIFICIAL OPENING: ICD-10-PCS | Performed by: INTERNAL MEDICINE

## 2021-03-01 RX ORDER — MORPHINE SULFATE 2 MG/ML
2 INJECTION, SOLUTION INTRAMUSCULAR; INTRAVENOUS EVERY 2 HOUR PRN
Status: DISCONTINUED | OUTPATIENT
Start: 2021-03-01 | End: 2021-03-04

## 2021-03-01 RX ORDER — MORPHINE SULFATE 2 MG/ML
INJECTION, SOLUTION INTRAMUSCULAR; INTRAVENOUS
Status: COMPLETED
Start: 2021-03-01 | End: 2021-03-01

## 2021-03-01 RX ORDER — MORPHINE SULFATE 2 MG/ML
0.5 INJECTION, SOLUTION INTRAMUSCULAR; INTRAVENOUS EVERY 2 HOUR PRN
Status: DISCONTINUED | OUTPATIENT
Start: 2021-03-01 | End: 2021-03-04

## 2021-03-01 RX ORDER — MORPHINE SULFATE 2 MG/ML
1 INJECTION, SOLUTION INTRAMUSCULAR; INTRAVENOUS EVERY 2 HOUR PRN
Status: DISCONTINUED | OUTPATIENT
Start: 2021-03-01 | End: 2021-03-04

## 2021-03-01 NOTE — H&P
[facility] Hospitalist History and Physical      Patient presents with:  Abdomen/Flank Pain  Sut Stap RingRemoval       PCP: Shayy Gordon MD      History of Present Illness: Patient is a 68year old female with PMH sig for HTN, ho SBO, presents for eval of a enlargment/tenderness/nodules appreciated   Lungs:   Clear to auscultation bilaterally. Normal effort   Chest wall:  No tenderness or deformity.    Heart:  Regular rate and rhythm, S1, S2 normal, no murmur, rub or gallop appreciated   Abdomen:   Soft, non-t are unremarkable. Visualized portions of the orbits are unremarkable. IMPRESSION: Mild global parenchymal volume loss. Sequelae of chronic small vessel ischemic disease is noted. No evidence of intracranial hemorrhage or extra-axial fluid collection.    Ag the pelvis. Mild mesenteric edema is noted. The distal ileal loops are decompressed. Colonic diverticulosis. ABDOMINAL WALL:  Unremarkable. PELVIC ORGANS:  Hysterectomy. LYMPH NODES:  Unremarkable.  BONES:  There is a nonspecific sclerotic lesion within IV pain and nausea meds  - ob series in am    # HTN  - holding amlodipine for now  - BP ok    Hep DQ          Outpatient records or previous hospital records reviewed.    [facility] hospitalist to continue to follow patient while in house  A total of 75  minutes t

## 2021-03-01 NOTE — PLAN OF CARE
Upon assessment pt is resting in bed. Bowel sounds hypoactive. Pt denies passing flatus. Abdomen rounded, tender. VSS, afebrile. Instructed in ambulating and deep breathing exercises. Remains NPO with ice chips only. NGT to low intermittent suction.  Verbal

## 2021-03-01 NOTE — PLAN OF CARE
Pt is A&O, VSS, with moderate c/o pain to abdomen- medication given. Pt is on RA with bilateral clear lung sounds, . Abdomen is soft and tender with hypoactive bowel sounds. DTV. Pt stated she had 3 small soft bms yesterday.  Right NG secured at 50cm at

## 2021-03-01 NOTE — CONSULTS
[facility]  Report of Consultation    Rl Barrios Patient Status:  Inpatient    1940 MRN [id number]   [facility] 3NW-A Attending Anel Steele MD   [address] Day # 1 PCP Lawrence Hernandez MD     3/1/21    Reason for Consultat injection 4 mg, 4 mg, Intravenous, Q30 Min PRN  •  amLODIPine Besylate (NORVASC) tab 10 mg, 10 mg, Oral, Daily  •  0.9% NaCl infusion, , Intravenous, Continuous  •  Heparin Sodium (Porcine) 5000 UNIT/ML injection 5,000 Units, 5,000 Units, Subcutaneous, Q8H WBC 17.9* 12.6*   HGB 13.7 12.4   MCV 80.5 82.6   .0 311.0       Recent Labs   Lab 02/28/21  1725 03/01/21  0420    143   K 4.1 3.6    108   CO2 24.0 29.0   BUN 21* 16   CREATSERUM 1.04* 0.75   * 112*   CA 11.3* 9.1   MG  --  2. 11/27/2019, 3:41 PM.  INDICATIONS:  abd spasms, staple removal  TECHNIQUE:  CT scanning was performed from the dome of the diaphragm to the pubic symphysis with non-ionic intravenous contrast material. Post contrast coronal MPR imaging was performed.   Dose cm in caliber. There is a mild amount of free fluid within the abdomen and  pelvis. Mild mesenteric edema is noted. 2. There is a small region of possible AVN within the right femoral head. There is a chronic appearing mild compression fracture of T12. Surgery  3/1/2021    Addendum (late entry)  Agree as above  Bowel rest and ambulate  If the OBS does not show much improvement tomorrow will obtain SBFT tomorrow  Voiced understanding

## 2021-03-02 ENCOUNTER — APPOINTMENT (OUTPATIENT)
Dept: GENERAL RADIOLOGY | Facility: HOSPITAL | Age: 81
DRG: 390 | End: 2021-03-02
Attending: PHYSICIAN ASSISTANT
Payer: MEDICARE

## 2021-03-02 PROCEDURE — C9113 INJ PANTOPRAZOLE SODIUM, VIA: HCPCS | Performed by: PHYSICIAN ASSISTANT

## 2021-03-02 PROCEDURE — 74019 RADEX ABDOMEN 2 VIEWS: CPT | Performed by: PHYSICIAN ASSISTANT

## 2021-03-02 NOTE — PLAN OF CARE
Writing RN received call from Dr. Sheyla Henry (Radiologist) that NGT tip in distal esophagus and suggests advancing 7-8 cm. Pt informed of above. NGT advanced. Placement verified via auscultation and tube secured. Will continue to monitor.

## 2021-03-02 NOTE — PROGRESS NOTES
Pt resting in bed, easy non labored breathing on ra. Vs wnl. Iv fluids infusing without difficulty. Pt has steady gait. Voids adequate amount. Ng to lis, brown/bile color output. Pt c/o pain and nausea, meds admin per orders. Plan of care discussed.  All qu

## 2021-03-02 NOTE — PLAN OF CARE
Upon assessment, VSS, afebrile. Pt rates pain to abdomen 2/10, declining need for pain med currently. Abdomen is soft, nondistended and tender. Bowel sounds hypoactive x4. Pt reports \"bloating\". Denies nausea. Pt remains NPO except ice.  NGT to [facility] pharmacy to review patient's medication profile  Outcome: Progressing     Problem: METABOLIC/FLUID AND ELECTROLYTES - ADULT  Goal: Electrolytes maintained within normal limits  Description: INTERVENTIONS:  - Monitor labs and rhythm and assess patient for s

## 2021-03-02 NOTE — PAYOR COMM NOTE
--------------  ADMISSION REVIEW     Payor: [id number] #:  [id number]  Authorization Number: [id number]    Admit date: 2/28/21  Admit time: 2119       Admitting Physician: Jamie Atwood MD  Attending Physician:  Vianey Holm • Unspecified essential hypertension               Past Surgical History:   Procedure Laterality Date   • CHOLECYSTECTOMY  07/2019    while in [facility]   • TOTAL ABDOM HYSTERECTOMY                  Social History    Tobacco Use      Smoking status: [facility] All other components within normal limits   LIPASE - Normal   RAPID SARS-COV-2 BY PCR - Normal   CBC WITH DIFFERENTIAL WITH PLATELET    Narrative: The following orders were created for panel order CBC WITH DIFFERENTIAL WITH PLATELET.   Procedure BONES:  There is a nonspecific sclerotic lesion within the posterior right acetabulum common not significantly changed since 11/27/2019. There is a mild chronic compression fracture of T12. 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   O Signed by Kamlesh Hewitt MD on 2/28/2021  7:36 PM            H&P - H&P Note      H&P signed by Shana Lesches, MD at 3/1/2021  3:20 PM     Author: Shana Lesches, MD Service: Hospitalist Author Type: Physician    Filed: 3/1/2021  3:20 PM Edward Head:  Normocephalic, without obvious abnormality, atraumatic. Eyes:  Sclera anicteric, No conjunctival pallor, EOMs intact. Nose: Nares normal. Septum midline. Mucosa normal. No drainage.    Throat: Lips, mucosa, and tongue normal. Teeth and gums norm PROCEDURE:  CT BRAIN OR HEAD (88176)  COMPARISON:  None. INDICATIONS:  fall  TECHNIQUE:  Noncontrast CT scanning is performed through the brain. Dose reduction techniques were used.  Dose information is transmitted to the [facility] PROCEDURE:  CT ABDOMEN+PELVIS (CONTRAST ONLY) (CPT=74177)  COMPARISON:  External Exams, CT, CT ABDOMEN + PELVIS(CONTRAST ONLY) (CPT=74177), 11/27/2019, 3:41 PM.  INDICATIONS:  abd spasms, staple removal  TECHNIQUE:  CT scanning was performed from the dome CONCLUSION:   1. Imaging findings are consistent with a small bowel obstruction with a transition point within the anterior central abdomen. The dilated loops of small bowel measure up to 5.3 cm in caliber.   There is a mild amount of free fluid within the PHYSICIAN Certification of Need for Inpatient Hospitalization - Initial Certification    Patient will require inpatient services that will reasonably be expected to span two midnight's based on the clinical documentation in H+P.    Based on patients current   while in [address]   • Arrhythmia       bradycardia   • Cataract     • Disorder of liver       fatty liver   • Heart attack [facility])       ischemia   • High blood pressure     • High cholesterol     • Small bowel obstruction ([facility] [address] 2014   • Unspecified ess •  Ketoconazole 2 % External Shampoo, Apply 1 Application topically once a week., Disp: 120 mL, Rfl: 1     •  mometasone 0.1 % External Ointment, Apply 1 Application topically daily as needed. , Disp: , Rfl:      •  Glucosamine HCl-Glucosamin SO4 (GLUCOSAMI PROCEDURE:  CT BRAIN OR HEAD (90309)  COMPARISON:  None. INDICATIONS:  fall  TECHNIQUE:  Noncontrast CT scanning is performed through the brain. Dose reduction techniques were used.  Dose information is transmitted to the [facility] of [facility]iology -recommended bowel rest, NPO ice chips  -IV fluids  -NG to LIS  -IV pain medication prn  -zofran prn  -encourage ambulation  -obs series tomorrow  All questions answered                 TONY Guadalupe [address] [place] [address]  General Surgery  3/1/2021 BOWEL/MESENTERY:  There are fluid-filled dilated loops of small bowel throughout the central and left aspect of the abdomen and pelvis.  These dilated loops of small bowel measure up to 5.3 cm.  On image 26, there is a transition point within the central 3/2/2021 0637 Given 5,000 Units Subcutaneous (Right Lower Abdomen) Shayy Epps RN    3/1/2021 2300 Given 5,000 Units Subcutaneous (Left Lower Abdomen) Shayy Epps RN    3/1/2021 1333 Given 5,000 Units Subcutaneous (Left Lower Abdomen) [facility]

## 2021-03-02 NOTE — DIETARY NOTE
[address]     Admitting diagnosis:  Small bowel obstruction ([facility] [address] [I35.041]    Ht:  5'3\"  Wt: 54.4 kg (120 lb). Body mass index is 21.26 kg/m².     Labs/Meds reviewed    Diet: Orders Placed This Encounter

## 2021-03-02 NOTE — PROGRESS NOTES
[facility]  Progress Note    Johnnie Segura Patient Status:  Inpatient    1940 MRN [id number]   [facility] 3NW-A Attending Jeramie Tapia MD   Hosp Day # 2 PCP No primary care provider on file.      Subjective:    Patient rep

## 2021-03-02 NOTE — PROGRESS NOTES
[facility] Hospitalist Progress Note                                                                   [facility]  5/22/1940    SUBJECTIVE: feels better.   One small flatus this am.      OBJECTIV Hospitalist  Pager: [phone]

## 2021-03-03 ENCOUNTER — APPOINTMENT (OUTPATIENT)
Dept: GENERAL RADIOLOGY | Facility: HOSPITAL | Age: 81
DRG: 390 | End: 2021-03-03
Attending: PHYSICIAN ASSISTANT
Payer: MEDICARE

## 2021-03-03 PROCEDURE — C9113 INJ PANTOPRAZOLE SODIUM, VIA: HCPCS | Performed by: PHYSICIAN ASSISTANT

## 2021-03-03 PROCEDURE — 74250 X-RAY XM SM INT 1CNTRST STD: CPT | Performed by: PHYSICIAN ASSISTANT

## 2021-03-03 NOTE — CM/SW NOTE
03/03/21 1000   CM/SW Screening   Referral Source Social Work (self-referral)   Information Source Chart review;Nursing rounds   Patient was screened during rounds and no needs are identified at this time. RN to contact SW/CM if needs arise.       [place]

## 2021-03-03 NOTE — PROGRESS NOTES
[facility]  Progress Note    Clarita Antonioudsen Patient Status:  Inpatient    1940 MRN [id number]   [facility] 3NW-A Attending Dennis Zuniga MD   Hosp Day # 3 PCP No primary care provider on file.      Subjective:    Patient rep

## 2021-03-03 NOTE — PAYOR COMM NOTE
--------------  CONTINUED STAY REVIEW------REQUESTING ADDITIONAL DAY 3/2      Payor: [address] #:  [id number]  Authorization Number: [id number]    Admit date: 2/28/21  Admit time: 2119    Admitting Physician: Edyta Cruz • sodium chloride 100 mL/hr at 03/02/21 1059      PRN: benzocaine-menthol, morphINE sulfate **OR** morphINE sulfate **OR** morphINE sulfate, acetaminophen, ondansetron HCl, PEG 3350, magnesium hydroxide, bisacodyl     Assessment/Plan:  Principal Problem: 3/2/2021 1059 New Bag (none) Intravenous Ivis Isaac, RN          Procedures:      Plan:

## 2021-03-03 NOTE — PROGRESS NOTES
[facility] Hospitalist Progress Note                                                                   [facility]  5/22/1940    SUBJECTIVE: Passing more flatus. Going for SBFT. No fevers. ok     Hep SQ       Dimple Zambrano  [facility] Hospitalist  Pager: [phone]

## 2021-03-03 NOTE — PLAN OF CARE
Patient resting in bed. VSS. NG to LIS. Brown/maroon color noted. Protonix given. Patient states passing flatus, denies nausea. Patient ambulating in halls. All safety measures in place. All questions and concerns addressed. Will continue to monitor.

## 2021-03-03 NOTE — PAYOR COMM NOTE
--------------  CONTINUED STAY REVIEW-----REQUESTING ADDITIONAL DAY 3/3      Payor: [address] [place] [address] #:  [id number]  Authorization Number: [id number]    Admit date: 2/28/21  Admit time: 2119    Admitting Physician: Gertrudis Amaro MD Continuous Infusions:   • sodium chloride 100 mL/hr at 03/03/21 0518      PRN: benzocaine-menthol, morphINE sulfate **OR** morphINE sulfate **OR** morphINE sulfate, acetaminophen, ondansetron HCl, PEG 3350, magnesium hydroxide, bisacodyl     Assessment/Brianna

## 2021-03-03 NOTE — PLAN OF CARE
Problem: Patient/Family Goals  Goal: Patient/Family Long Term Goal  Description: Patient's Long Term Goal: discharge    Interventions:  - advance diet  - See additional Care Plan goals for specific interventions  Outcome: Progressing  Goal: Patient/Famil symptoms of electrolyte imbalances  - Administer electrolyte replacement as ordered  - Monitor response to electrolyte replacements, including rhythm and repeat lab results as appropriate  - Fluid restriction as ordered  - Instruct patient on fluid and nut

## 2021-03-04 VITALS
BODY MASS INDEX: 21 KG/M2 | RESPIRATION RATE: 18 BRPM | DIASTOLIC BLOOD PRESSURE: 52 MMHG | TEMPERATURE: 98 F | OXYGEN SATURATION: 97 % | HEART RATE: 54 BPM | WEIGHT: 120 LBS | SYSTOLIC BLOOD PRESSURE: 143 MMHG

## 2021-03-04 PROCEDURE — C9113 INJ PANTOPRAZOLE SODIUM, VIA: HCPCS | Performed by: PHYSICIAN ASSISTANT

## 2021-03-04 RX ORDER — POLYETHYLENE GLYCOL 3350 17 G/17G
17 POWDER, FOR SOLUTION ORAL DAILY
Qty: 30 EACH | Refills: 0 | Status: SHIPPED | OUTPATIENT
Start: 2021-03-04

## 2021-03-04 NOTE — DIETARY NOTE
[address]     Admitting diagnosis:  [facility] [E26.094]    Ht:  5'3\"  Wt: 54.4 kg (120 lb). Body mass index is 21.26 kg/m².     Wt Readings from Last 6 Encounters:  02/28/21 : 54.4 kg (120 l

## 2021-03-04 NOTE — PROGRESS NOTES
[facility]  Progress Note    Esthela Burgos Patient Status:  Inpatient    1940 MRN [id number]   [facility] 3NW-A Attending Chary Baird MD   Hosp Day # 4 PCP No primary care provider on file.      Subjective:    Patient chelsie

## 2021-03-04 NOTE — PLAN OF CARE
Problem: Patient/Family Goals  Goal: Patient/Family Long Term Goal  Description: Patient's Long Term Goal: go home     Interventions:  - clear liquid diet  - See additional Care Plan goals for specific interventions  Outcome: Progressing  Goal: Patient/F , c pox ,o2sat 96% . On tele with sr . Denies any sob or chest pain . Abdomen less distended , denies any pain . tolerated clear liquid diet . Denies any nausea or vomiting . Up in room , ambulated in quinones Plan  of care discussed , answered all questions .

## 2021-03-04 NOTE — DISCHARGE SUMMARY
General Medicine Discharge Summary     Patient ID:  Aniya Person  68year old  5/22/1940    Admit date: 2/28/2021    Discharge date and time: 3/4/2021    Attending Physician: Caterina Murcia MD I PERSONALLY RECONCILED CURRENT AND DISCHARGE MEDICATIONS ON DAY OF DISCHARGE      Activity: activity as tolerated  Diet: low fiber    Wound Care: as directed  Code Status: Full Code      Exam on day of discharge:      03/04/21  1210   BP: 131/47

## 2021-03-04 NOTE — PLAN OF CARE
Problem: GASTROINTESTINAL - ADULT  Goal: Minimal or absence of nausea and vomiting  Description: INTERVENTIONS:  - Maintain adequate hydration with IV or PO as ordered and tolerated  - Nasogastric tube to low intermittent suction as ordered  - Evaluate e easy non labored breathing on ra. Vs wnl.up ad kevin. Steady gait. Voids adequate amount. Pt tolerating clear liquids. Denies any pain or nausea at this time. Iv fluids infusing without difficulty. Pm meds admin, plan of care discussed.  Pt in agreement with

## 2021-03-05 NOTE — PROGRESS NOTES
NURSING DISCHARGE NOTE    Discharged Home via Wheelchair. Accompanied by Family member and Support staff  Belongings Taken by patient/family discussed discharge instructions with patient and family . answered all questions . Verbalized understanding .

## 2021-03-05 NOTE — PLAN OF CARE
Problem: Patient/Family Goals  Goal: Patient/Family Long Term Goal  Description: Patient's Long Term Goal:     Interventions:  -   - See additional Care Plan goals for specific interventions  3/4/2021 1857 by Louis Cole RN  Outcome: Adequate for Disc signs and symptoms of electrolyte imbalances  - Administer electrolyte replacement as ordered  - Monitor response to electrolyte replacements, including rhythm and repeat lab results as appropriate  - Fluid restriction as ordered  - Instruct patient on flu

## 2021-03-05 NOTE — PAYOR COMM NOTE
--------------  DISCHARGE REVIEW    Payor: [address] #:  [id number]  Authorization Number: [id number]    Admit date: 2/28/21  Admit time:  2119  Discharge Date: 3/4/2021  7:12 PM     Admitting Physician: Caterina Murcia MD  At SURGERY  IP CONSULT TO FOOD AND NUTRITION SERVICES    Operative Procedures:        Patient instructions:      Current Discharge Medication List    START taking these medications    PEG 3350 17 g Oral Powd Pack  Take 17 g by mouth daily.       CONTINUE these

## 2021-07-03 ENCOUNTER — APPOINTMENT (OUTPATIENT)
Dept: GENERAL RADIOLOGY | Facility: HOSPITAL | Age: 81
DRG: 389 | End: 2021-07-03
Attending: STUDENT IN AN ORGANIZED HEALTH CARE EDUCATION/TRAINING PROGRAM
Payer: MEDICARE

## 2021-07-03 ENCOUNTER — HOSPITAL ENCOUNTER (INPATIENT)
Facility: HOSPITAL | Age: 81
LOS: 2 days | Discharge: HOME | DRG: 389 | End: 2021-07-05
Attending: STUDENT IN AN ORGANIZED HEALTH CARE EDUCATION/TRAINING PROGRAM | Admitting: HOSPITALIST
Payer: MEDICARE

## 2021-07-03 ENCOUNTER — APPOINTMENT (OUTPATIENT)
Dept: CT IMAGING | Facility: HOSPITAL | Age: 81
DRG: 389 | End: 2021-07-03
Attending: STUDENT IN AN ORGANIZED HEALTH CARE EDUCATION/TRAINING PROGRAM
Payer: MEDICARE

## 2021-07-03 DIAGNOSIS — K56.609 SBO (SMALL BOWEL OBSTRUCTION) (HCC): Primary | ICD-10-CM

## 2021-07-03 DIAGNOSIS — N28.9 RENAL INSUFFICIENCY: ICD-10-CM

## 2021-07-03 DIAGNOSIS — R10.9 ABDOMINAL PAIN, ACUTE: ICD-10-CM

## 2021-07-03 DIAGNOSIS — R11.2 NON-INTRACTABLE VOMITING WITH NAUSEA, UNSPECIFIED VOMITING TYPE: ICD-10-CM

## 2021-07-03 LAB
ALBUMIN SERPL-MCNC: 4.4 G/DL (ref 3.4–5)
ALBUMIN/GLOB SERPL: 1.1 {RATIO} (ref 1–2)
ALP LIVER SERPL-CCNC: 90 U/L
ALT SERPL-CCNC: 24 U/L
ANION GAP SERPL CALC-SCNC: 8 MMOL/L (ref 0–18)
AST SERPL-CCNC: 15 U/L (ref 15–37)
BASOPHILS # BLD AUTO: 0.04 X10(3) UL (ref 0–0.2)
BASOPHILS NFR BLD AUTO: 0.3 %
BILIRUB SERPL-MCNC: 1.4 MG/DL (ref 0.1–2)
BILIRUB UR QL STRIP.AUTO: NEGATIVE
BUN BLD-MCNC: 24 MG/DL (ref 7–18)
BUN/CREAT SERPL: 18 (ref 10–20)
CALCIUM BLD-MCNC: 9.9 MG/DL (ref 8.5–10.1)
CHLORIDE SERPL-SCNC: 98 MMOL/L (ref 98–112)
CLARITY UR REFRACT.AUTO: CLEAR
CO2 SERPL-SCNC: 28 MMOL/L (ref 21–32)
CREAT BLD-MCNC: 1.33 MG/DL
DEPRECATED RDW RBC AUTO: 39.9 FL (ref 35.1–46.3)
EOSINOPHIL # BLD AUTO: 0.03 X10(3) UL (ref 0–0.7)
EOSINOPHIL NFR BLD AUTO: 0.2 %
ERYTHROCYTE [DISTWIDTH] IN BLOOD BY AUTOMATED COUNT: 13.4 % (ref 11–15)
GLOBULIN PLAS-MCNC: 4 G/DL (ref 2.8–4.4)
GLUCOSE BLD-MCNC: 130 MG/DL (ref 70–99)
GLUCOSE UR STRIP.AUTO-MCNC: NEGATIVE MG/DL
HCT VFR BLD AUTO: 40.5 %
HGB BLD-MCNC: 13.1 G/DL
HYALINE CASTS #/AREA URNS AUTO: PRESENT /LPF
IMM GRANULOCYTES # BLD AUTO: 0.06 X10(3) UL (ref 0–1)
IMM GRANULOCYTES NFR BLD: 0.4 %
LEUKOCYTE ESTERASE UR QL STRIP.AUTO: NEGATIVE
LIPASE SERPL-CCNC: 93 U/L (ref 73–393)
LYMPHOCYTES # BLD AUTO: 1.52 X10(3) UL (ref 1–4)
LYMPHOCYTES NFR BLD AUTO: 10.4 %
M PROTEIN MFR SERPL ELPH: 8.4 G/DL (ref 6.4–8.2)
MCH RBC QN AUTO: 26.5 PG (ref 26–34)
MCHC RBC AUTO-ENTMCNC: 32.3 G/DL (ref 31–37)
MCV RBC AUTO: 82 FL
MONOCYTES # BLD AUTO: 0.88 X10(3) UL (ref 0.1–1)
MONOCYTES NFR BLD AUTO: 6 %
NEUTROPHILS # BLD AUTO: 12.11 X10 (3) UL (ref 1.5–7.7)
NEUTROPHILS # BLD AUTO: 12.11 X10(3) UL (ref 1.5–7.7)
NEUTROPHILS NFR BLD AUTO: 82.7 %
NITRITE UR QL STRIP.AUTO: NEGATIVE
OSMOLALITY SERPL CALC.SUM OF ELEC: 284 MOSM/KG (ref 275–295)
PH UR STRIP.AUTO: 6 [PH] (ref 5–8)
PLATELET # BLD AUTO: 356 10(3)UL (ref 150–450)
POTASSIUM SERPL-SCNC: 3.8 MMOL/L (ref 3.5–5.1)
PROT UR STRIP.AUTO-MCNC: NEGATIVE MG/DL
RBC # BLD AUTO: 4.94 X10(6)UL
SARS-COV-2 RNA RESP QL NAA+PROBE: NOT DETECTED
SODIUM SERPL-SCNC: 134 MMOL/L (ref 136–145)
SP GR UR STRIP.AUTO: 1 (ref 1–1.03)
UROBILINOGEN UR STRIP.AUTO-MCNC: <2 MG/DL
WBC # BLD AUTO: 14.6 X10(3) UL (ref 4–11)

## 2021-07-03 PROCEDURE — 96374 THER/PROPH/DIAG INJ IV PUSH: CPT

## 2021-07-03 PROCEDURE — 96375 TX/PRO/DX INJ NEW DRUG ADDON: CPT

## 2021-07-03 PROCEDURE — 83690 ASSAY OF LIPASE: CPT | Performed by: STUDENT IN AN ORGANIZED HEALTH CARE EDUCATION/TRAINING PROGRAM

## 2021-07-03 PROCEDURE — 74177 CT ABD & PELVIS W/CONTRAST: CPT | Performed by: STUDENT IN AN ORGANIZED HEALTH CARE EDUCATION/TRAINING PROGRAM

## 2021-07-03 PROCEDURE — 71045 X-RAY EXAM CHEST 1 VIEW: CPT | Performed by: STUDENT IN AN ORGANIZED HEALTH CARE EDUCATION/TRAINING PROGRAM

## 2021-07-03 PROCEDURE — 99285 EMERGENCY DEPT VISIT HI MDM: CPT

## 2021-07-03 PROCEDURE — 80053 COMPREHEN METABOLIC PANEL: CPT | Performed by: STUDENT IN AN ORGANIZED HEALTH CARE EDUCATION/TRAINING PROGRAM

## 2021-07-03 PROCEDURE — 96361 HYDRATE IV INFUSION ADD-ON: CPT

## 2021-07-03 PROCEDURE — 81001 URINALYSIS AUTO W/SCOPE: CPT | Performed by: STUDENT IN AN ORGANIZED HEALTH CARE EDUCATION/TRAINING PROGRAM

## 2021-07-03 PROCEDURE — 85025 COMPLETE CBC W/AUTO DIFF WBC: CPT | Performed by: STUDENT IN AN ORGANIZED HEALTH CARE EDUCATION/TRAINING PROGRAM

## 2021-07-03 RX ORDER — MORPHINE SULFATE 2 MG/ML
1 INJECTION, SOLUTION INTRAMUSCULAR; INTRAVENOUS EVERY 2 HOUR PRN
Status: DISCONTINUED | OUTPATIENT
Start: 2021-07-03 | End: 2021-07-05

## 2021-07-03 RX ORDER — MORPHINE SULFATE 4 MG/ML
4 INJECTION, SOLUTION INTRAMUSCULAR; INTRAVENOUS EVERY 30 MIN PRN
Status: DISCONTINUED | OUTPATIENT
Start: 2021-07-03 | End: 2021-07-05

## 2021-07-03 RX ORDER — MORPHINE SULFATE 2 MG/ML
2 INJECTION, SOLUTION INTRAMUSCULAR; INTRAVENOUS EVERY 2 HOUR PRN
Status: DISCONTINUED | OUTPATIENT
Start: 2021-07-03 | End: 2021-07-05

## 2021-07-03 RX ORDER — ACETAMINOPHEN 325 MG/1
650 TABLET ORAL EVERY 6 HOURS PRN
Status: DISCONTINUED | OUTPATIENT
Start: 2021-07-03 | End: 2021-07-05

## 2021-07-03 RX ORDER — SODIUM CHLORIDE 9 MG/ML
INJECTION, SOLUTION INTRAVENOUS CONTINUOUS
Status: DISCONTINUED | OUTPATIENT
Start: 2021-07-03 | End: 2021-07-05

## 2021-07-03 RX ORDER — ONDANSETRON 2 MG/ML
4 INJECTION INTRAMUSCULAR; INTRAVENOUS EVERY 4 HOURS PRN
Status: DISCONTINUED | OUTPATIENT
Start: 2021-07-03 | End: 2021-07-03

## 2021-07-03 RX ORDER — SODIUM CHLORIDE 9 MG/ML
INJECTION, SOLUTION INTRAVENOUS CONTINUOUS
Status: ACTIVE | OUTPATIENT
Start: 2021-07-03 | End: 2021-07-03

## 2021-07-03 RX ORDER — ONDANSETRON 2 MG/ML
4 INJECTION INTRAMUSCULAR; INTRAVENOUS EVERY 6 HOURS PRN
Status: DISCONTINUED | OUTPATIENT
Start: 2021-07-03 | End: 2021-07-05

## 2021-07-03 RX ORDER — ONDANSETRON 2 MG/ML
4 INJECTION INTRAMUSCULAR; INTRAVENOUS ONCE
Status: COMPLETED | OUTPATIENT
Start: 2021-07-03 | End: 2021-07-03

## 2021-07-03 RX ORDER — HEPARIN SODIUM 5000 [USP'U]/ML
5000 INJECTION, SOLUTION INTRAVENOUS; SUBCUTANEOUS EVERY 8 HOURS SCHEDULED
Status: DISCONTINUED | OUTPATIENT
Start: 2021-07-03 | End: 2021-07-05

## 2021-07-03 RX ORDER — MORPHINE SULFATE 4 MG/ML
4 INJECTION, SOLUTION INTRAMUSCULAR; INTRAVENOUS EVERY 30 MIN PRN
Status: DISCONTINUED | OUTPATIENT
Start: 2021-07-03 | End: 2021-07-03

## 2021-07-03 RX ORDER — METOCLOPRAMIDE HYDROCHLORIDE 5 MG/ML
5 INJECTION INTRAMUSCULAR; INTRAVENOUS EVERY 8 HOURS PRN
Status: DISCONTINUED | OUTPATIENT
Start: 2021-07-03 | End: 2021-07-05

## 2021-07-03 NOTE — ED PROVIDER NOTES
Patient Seen in: [facility] Emergency Department      History   Patient presents with:  Abdomen/Flank Pain    Stated Complaint: Pt here with daughter.  Daughter is stating that pt may be having a small bowel *    HPI/Subjective:   HPI    Patient is a °C)   Resp 14   Ht 160 cm (5' 3\")   Wt 53.5 kg   SpO2 97%   BMI 20.90 kg/m²         Physical Exam    Constitutional: oriented to person, place, and time, appears well-developed and well-nourished. HENT:   Head: Normocephalic and atraumatic.    Eyes: No s Abnormality         Status                     ---------                               -----------         ------                     CBC W/ DIFFERENTIAL[[id number]]          Abnormal            Final result                 Please view re

## 2021-07-03 NOTE — H&P
[facility] Hospitalist H&P       CC: Patient presents with:  Abdomen/Flank Pain       PCP: Rolanda Beyer MD    History of Present Illness:  Ms. Jessica Ramírez is a 81 yo female with PMH of HTN, LD, prior SBO, hx of bowel resection and colostomy with subsequent takedown : 108/65  03/04/21 : 143/52    Wt Readings from Last 3 Encounters:  07/03/21 : 118 lb (53.5 kg)  07/02/21 : 118 lb (53.5 kg)  02/28/21 : 120 lb (54.4 kg)      Wt Readings from Last 6 Encounters:  07/03/21 : 118 lb (53.5 kg)  07/02/21 : 118 lb (53.5 kg)  02 pain.   CONTRAST USED:  80cc of Omnipaque 350  FINDINGS:  LIVER:  Focal decreased attenuation adjacent to falciform ligament is in a typical location for focal fatty infiltration.   There are scattered punctate low-attenuation foci in liver which are not ch related to adhesions. 2. Mild-to-moderate compression deformity at T12 is stable. 3. Subchondral serpiginous sclerosis right femoral head is probably an area of previous AVN. This is stable.    Dictated by (CST): Flossie Blizzard, MD on 7/03/2021 at 2:45 PM

## 2021-07-03 NOTE — ED QUICK NOTES
Pt sitting up on stretcher nad resps easy nonlabored family at bs.  Pt stating diffuse intermittent abd pain 4/10

## 2021-07-04 LAB
ANION GAP SERPL CALC-SCNC: 4 MMOL/L (ref 0–18)
BASOPHILS # BLD AUTO: 0.05 X10(3) UL (ref 0–0.2)
BASOPHILS NFR BLD AUTO: 0.5 %
BUN BLD-MCNC: 14 MG/DL (ref 7–18)
BUN/CREAT SERPL: 18.2 (ref 10–20)
CALCIUM BLD-MCNC: 8.2 MG/DL (ref 8.5–10.1)
CHLORIDE SERPL-SCNC: 108 MMOL/L (ref 98–112)
CO2 SERPL-SCNC: 29 MMOL/L (ref 21–32)
CREAT BLD-MCNC: 0.77 MG/DL
DEPRECATED RDW RBC AUTO: 42.2 FL (ref 35.1–46.3)
EOSINOPHIL # BLD AUTO: 0.17 X10(3) UL (ref 0–0.7)
EOSINOPHIL NFR BLD AUTO: 1.8 %
ERYTHROCYTE [DISTWIDTH] IN BLOOD BY AUTOMATED COUNT: 13.5 % (ref 11–15)
GLUCOSE BLD-MCNC: 129 MG/DL (ref 70–99)
GLUCOSE BLD-MCNC: 78 MG/DL (ref 70–99)
GLUCOSE BLD-MCNC: 79 MG/DL (ref 70–99)
GLUCOSE BLD-MCNC: 82 MG/DL (ref 70–99)
GLUCOSE BLD-MCNC: 86 MG/DL (ref 70–99)
GLUCOSE BLD-MCNC: 99 MG/DL (ref 70–99)
HCT VFR BLD AUTO: 36.3 %
HGB BLD-MCNC: 11.5 G/DL
IMM GRANULOCYTES # BLD AUTO: 0.03 X10(3) UL (ref 0–1)
IMM GRANULOCYTES NFR BLD: 0.3 %
LYMPHOCYTES # BLD AUTO: 2.07 X10(3) UL (ref 1–4)
LYMPHOCYTES NFR BLD AUTO: 22.4 %
MCH RBC QN AUTO: 26.9 PG (ref 26–34)
MCHC RBC AUTO-ENTMCNC: 31.7 G/DL (ref 31–37)
MCV RBC AUTO: 85 FL
MONOCYTES # BLD AUTO: 0.78 X10(3) UL (ref 0.1–1)
MONOCYTES NFR BLD AUTO: 8.4 %
NEUTROPHILS # BLD AUTO: 6.16 X10 (3) UL (ref 1.5–7.7)
NEUTROPHILS # BLD AUTO: 6.16 X10(3) UL (ref 1.5–7.7)
NEUTROPHILS NFR BLD AUTO: 66.6 %
OSMOLALITY SERPL CALC.SUM OF ELEC: 291 MOSM/KG (ref 275–295)
PLATELET # BLD AUTO: 297 10(3)UL (ref 150–450)
POTASSIUM SERPL-SCNC: 3.3 MMOL/L (ref 3.5–5.1)
RBC # BLD AUTO: 4.27 X10(6)UL
SODIUM SERPL-SCNC: 141 MMOL/L (ref 136–145)
WBC # BLD AUTO: 9.3 X10(3) UL (ref 4–11)

## 2021-07-04 PROCEDURE — 80048 BASIC METABOLIC PNL TOTAL CA: CPT | Performed by: INTERNAL MEDICINE

## 2021-07-04 PROCEDURE — 82962 GLUCOSE BLOOD TEST: CPT

## 2021-07-04 PROCEDURE — 85025 COMPLETE CBC W/AUTO DIFF WBC: CPT | Performed by: INTERNAL MEDICINE

## 2021-07-04 NOTE — PROGRESS NOTES
[facility] Hospitalist Progress Note     [facility]      SUBJECTIVE:  Feeling better  NGT clamped    OBJECTIVE:  Temp:  [97.6 °F (36.4 °C)-98.4 °F (36.9 °C)] 97.6 °F (36.4 °C)  Pulse:  [60-78] 73  Resp:  [14-20] 18  BP: (118-155)/(51-72) 141/51    Intake/[employer]) NRDR ([address]) which includes the Dose Index Registry. PATIENT STATED HISTORY:(As transcribed by Technologist)  Patient with diffuse abdomen pain.    CONTRAST USED:  80cc of Omnipaque 350  FINDINGS:  LIVER:  F CONCLUSION:  1. There is a small bowel obstruction with transition distal jejunum in mid abdomen to the right of midline on image number described above. This is most likely related to adhesions.  2. Mild-to-moderate compression deformity at T12 i Assessment/Plan:     Ms. 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     # SBO  - hx of prior -- was admitted

## 2021-07-04 NOTE — PLAN OF CARE
PT A/O, 96% ON RA, UP VOIDING IN BATHROOM, NG TO LIS, DRAINING SMALL AMOUNT OF BROWNISH FLUID, NPO, IVF INFUSING, UP WALING IN HINSON, PASSING FLATUS, ACCUCHECK THIS AM 86, LABS THIS AM.    Problem: GASTROINTESTINAL - ADULT  Goal: Maintains or returns to bas

## 2021-07-04 NOTE — CONSULTS
Argelia Pizano is a 80year old female  Patient presents with:  Abdomen/Flank Pain      HPI: 79 y/o female admitted from er with partial small bowel obstruction  H/o colon resection with colostomy 2 years ago with subsequent reversal  Similar episode of par

## 2021-07-04 NOTE — PROGRESS NOTES
N/g tube clamped for 6hrs , residual checked 10cc , n/g tube dc'd  Per order , started clear liquid diet .  Updated with patient

## 2021-07-04 NOTE — PLAN OF CARE
Problem: Patient/Family Goals  Goal: Patient/Family Long Term Goal  Description: Patient's Long Term Goal: go home     Interventions:  - npo   N/g tube   - See additional Care Plan goals for specific interventions  Outcome: Progressing  Goal: Patient/Fam

## 2021-07-04 NOTE — PROGRESS NOTES
Doing well    abd soft and non tender    Ng clamped for 6 hrs and tolerated than removed    Imp small bowel obstruction   improving    Plan clear liquid diet

## 2021-07-05 VITALS
WEIGHT: 118 LBS | BODY MASS INDEX: 20.91 KG/M2 | RESPIRATION RATE: 18 BRPM | HEIGHT: 63 IN | OXYGEN SATURATION: 100 % | DIASTOLIC BLOOD PRESSURE: 49 MMHG | SYSTOLIC BLOOD PRESSURE: 142 MMHG | HEART RATE: 59 BPM | TEMPERATURE: 98 F

## 2021-07-05 LAB
ANION GAP SERPL CALC-SCNC: 4 MMOL/L (ref 0–18)
BUN BLD-MCNC: 8 MG/DL (ref 7–18)
BUN/CREAT SERPL: 12.5 (ref 10–20)
CALCIUM BLD-MCNC: 8.4 MG/DL (ref 8.5–10.1)
CHLORIDE SERPL-SCNC: 107 MMOL/L (ref 98–112)
CO2 SERPL-SCNC: 29 MMOL/L (ref 21–32)
CREAT BLD-MCNC: 0.64 MG/DL
GLUCOSE BLD-MCNC: 86 MG/DL (ref 70–99)
GLUCOSE BLD-MCNC: 93 MG/DL (ref 70–99)
OSMOLALITY SERPL CALC.SUM OF ELEC: 288 MOSM/KG (ref 275–295)
POTASSIUM SERPL-SCNC: 3.2 MMOL/L (ref 3.5–5.1)
SODIUM SERPL-SCNC: 140 MMOL/L (ref 136–145)

## 2021-07-05 PROCEDURE — 80048 BASIC METABOLIC PNL TOTAL CA: CPT | Performed by: INTERNAL MEDICINE

## 2021-07-05 PROCEDURE — 82962 GLUCOSE BLOOD TEST: CPT

## 2021-07-05 RX ORDER — ATORVASTATIN CALCIUM 20 MG/1
20 TABLET, FILM COATED ORAL DAILY
Status: DISCONTINUED | OUTPATIENT
Start: 2021-07-05 | End: 2021-07-05

## 2021-07-05 RX ORDER — AMLODIPINE BESYLATE 5 MG/1
10 TABLET ORAL DAILY
Status: DISCONTINUED | OUTPATIENT
Start: 2021-07-05 | End: 2021-07-05

## 2021-07-05 RX ORDER — POTASSIUM CHLORIDE 20 MEQ/1
40 TABLET, EXTENDED RELEASE ORAL ONCE
Status: COMPLETED | OUTPATIENT
Start: 2021-07-05 | End: 2021-07-05

## 2021-07-05 NOTE — PLAN OF CARE
Patient is alert and oriented. On room air. Abdomen is soft/ non-distended. Patient states passing gas. On clear liquid diet. Voiding. Saline locked. Denies pain. Plan of care updated with patient. Patient verbalized understanding.        Problem: Patient/F

## 2021-07-05 NOTE — PROGRESS NOTES
NURSING DISCHARGE NOTE    Discharged Home via Wheelchair. Accompanied by Family member  Belongings Taken by patient/family. IV taken out. Discharge instructions given to patient. Patient verbalized understanding.

## 2021-07-05 NOTE — PROGRESS NOTES
Doing well    abd soft and non tender    Tolerating liquids    Ok for discharge    Advance diet as tolerated

## 2021-07-05 NOTE — DISCHARGE SUMMARY
General Medicine Discharge Summary     Patient ID:  Hector Newman  80year old  5/22/1940    Admit date: 7/3/2021    Discharge date and time: 7/5/2021 12:06 PM     Attending Physician: Kary Rios MD    Primary Care Physician: MD Gill Jay total) by mouth daily. , Normal, Disp-90 tablet, R-3    atorvastatin 20 MG Oral Tab  Take 1 tablet (20 mg total) by mouth daily. , Normal, Disp-90 tablet, R-3    Ketoconazole 2 % External Shampoo  Apply 1 Application topically once a week., Normal, Disp-120

## 2021-07-06 NOTE — PAYOR COMM NOTE
--------------  ADMISSION REVIEW     Payor: [address] #:  [id number]  Authorization Number: [employer] date: 7/3/21  Admit time:  6:46 PM       Admitting Physician: Bautista Balderas MD  Attending Physician:  No att. providers Exam     ED Triage Vitals   BP 07/03/21 1301 155/61   Pulse 07/03/21 1301 78   Resp 07/03/21 1301 20   Temp 07/03/21 1301 98.4 °F (36.9 °C)   Temp src --    SpO2 07/03/21 1301 98 %   O2 Device 07/03/21 1500 None (Room air)       Current:/63   Pulse 6 Absolute 12.11 (*)     All other components within normal limits   LIPASE - Normal   RAPID SARS-COV-2 BY PCR - Normal   CBC WITH DIFFERENTIAL WITH PLATELET    Narrative:      The following orders were created for panel order CBC With Differential With Plate obstruction) [facility]) K56.609 12/20/2020 Unknown               Signed by Evan Dunaway MD on 7/3/2021  4:11 PM            H&P - H&P Note      H&P signed by Efra Dias MD at 7/3/2021  4:34 PM     Author: Efra Dias MD Service: Nadine Tyler Author Type: Ph no rebound or guarding, no hepatomegaly, hypoactive BS  MSK: moving all extremities, no edema  Neuro: no focal deficits  Skin: no rashes/lesions  Psych: normal mood/affect          Diagnostic Data:    CBC/Chem  Recent Labs   Lab 07/03/21  1314   WBC 14.6* Discussed with patient and daughter in the [address] [place] [address]  Internal Medicine  [facility] Hospitalist    Electronically signed by Mellisa Jaramillo MD on 7/3/2021  4:34 PM       7/4  SUBJECTIVE:  Feeling better  NGT clamped     OBJECTIVE:  Temp:  [97.6 °F ectatic aorta seen. No pneumothorax.    Dictated by (CST): Layla Sanders MD on 7/03/2021 at 5:01 PM     Finalized by (CST): Layla Sanders MD on 7/03/2021 at 5:01 PM           Meds:   No current outpatient medications on file.        potassium chloride [address] [facility]  Internal Medicine  [facility] Hospitalist       7/5  Discharge Summary signed by Amado Gil MD at 7/5/2021  1:10 PM    Author: Amado Gil MD Service: Hospitalist Author Type: Physician   Filed: 7/5/2021  1:10 PM Date of Service: 7/5/2021  1: GENERAL SURGERY     Operative Procedures:       Disposition: home     Patient Instructions:   Discharge Medication List as of 7/5/2021 11:44 AM     CONTINUE these medications which have NOT CHANGED     PEG 3350 17 g Oral Powd Pack  Take 17 g by mouth daily